# Patient Record
Sex: FEMALE
[De-identification: names, ages, dates, MRNs, and addresses within clinical notes are randomized per-mention and may not be internally consistent; named-entity substitution may affect disease eponyms.]

---

## 2021-02-23 ENCOUNTER — HOSPITAL ENCOUNTER (OUTPATIENT)
Dept: HOSPITAL 35 - PAIN | Age: 61
End: 2021-02-23
Attending: ANESTHESIOLOGY
Payer: COMMERCIAL

## 2021-02-23 VITALS — HEIGHT: 65.98 IN | BODY MASS INDEX: 23.3 KG/M2 | WEIGHT: 145 LBS

## 2021-02-23 VITALS — SYSTOLIC BLOOD PRESSURE: 117 MMHG | DIASTOLIC BLOOD PRESSURE: 81 MMHG

## 2021-02-23 DIAGNOSIS — Z79.899: ICD-10-CM

## 2021-02-23 DIAGNOSIS — M54.6: Primary | ICD-10-CM

## 2021-02-23 DIAGNOSIS — Z88.8: ICD-10-CM

## 2021-02-23 DIAGNOSIS — M79.10: ICD-10-CM

## 2021-02-23 NOTE — NUR
Pain Clinic Assessment:
 
1. History of Osteoarthritis:
NONE
   History of Rheumatoid Arthritis:
NONE
 
2. Height: 5 ft. 6 in. 167.6 cm.
   Weight: 145.0 lb.  oz. 65.772 kg.
   Patient's BMI: 23.4
 
3. Vital Signs:
   BP: 117/81 Pulse: 83 Resp: 14
   Temp:  02 Sat: 100 ECG Mon:
 
4. Pain Intensity: 5-6
 
5. Fall Risk:
   Dizziness: N  Needs help standing or walking: N
   Fallen in the last 3 months: N
   Fall risk comments:
 
 
6. Patient on Blood Thinner: None
 
7. History of Hypertension: N
 
8. Opioid Therapy greater than 6 weeks: N
   Opiate Contract Signed:
 
9. Risk Assessment Tool Provided:
 
10. Functional Assessment Tool:
 
11. Recreational Drug Use: Never Drug Type:
    Tobacco Use: Current Every Day Smoker Tobacco Type: Cigarettes
       Amount or Packs/day: 0.5 How Many Years: 40
    Alcohol Use: No  Frequency:  Quant: Patient sent in by Dr. Trevizo for headache x 1 week.  requesting spinal tap and MRI. Patient c/o nausea, but denies vomiting, fever, blurred vision.

## 2021-02-24 NOTE — HPC
[facility]
[address]
[place]   [address]                     PAIN MANAGEMENT CONSULTATION  
_______________________________________________________________________________
 
Name:       ZION AIKEN              Room #:                     REG SHORTY ROACHKettyGRETTAKetty#:      [id number]                       Account #:      [id number]  
Admission:  21    Attend Phys:    Duglas Ohara DO  
Discharge:              Date of Birth:  [birth year]  
                                                          Report #: [id number]
                                                                    [id number]   
_______________________________________________________________________________
THIS REPORT FOR:  
 
cc:  CJ ESPITIA               
     Physician not on staff                                               
     Duglas Ohara DO                                          ~
DATE OF SERVICE:  2021
 
 
REFERRING PHYSICIAN:  Yves Perez MD; [facility].
 
CHIEF COMPLAINT:  Right thoracic pain.
 
HISTORY OF PRESENT ILLNESS:  As you know, the patient is a 61-year-old female
who reports acute onset of mid thoracic pain that occurred 2020.  The
patient states she sustained a slip and fall injury at work.  She was found to
have thoracic compression fractures and underwent injection therapies with Dr. Walker with minimal benefit.  She underwent radiofrequency lesioning, but reports
that she did not undergo medial branch nerve blocks prior to the lesioning
procedure.  She ultimately underwent thoracic kyphoplasty at T10 in November
with no improvement in symptoms.  She sought further evaluation through
Neurosurgery per the request of Dr. Walker.  The patient states that she has
trialed all available conservative treatment options including physical therapy.
 She is currently in an [place] thoracic brace, which does afford her some
improvement in symptoms.  She has been referred to our service to discuss the
potential for treating right thoracic pain that remains.  There is a question of
whether or not the facet joints at the T7 through T10 level may be the source of
her symptoms on the right side.
 
The patient indicates today her pain is steady with intermittent exacerbations
and periodic increase in pain.  She describes the pain as shooting, cramping,
crushing and stabbing, places current pain score 4/10, daily average of 4-5/10,
worst pain has been is 10/10.  The patient states that pain is exacerbated with
standing, sitting for any length of time or walking, improves with lying down
and the use of diazepam for her reported muscle spasming.  She has been referred
to our service to discuss interventional treatment options to address mid
thoracic right sided pain.
 
PAST MEDICAL HISTORY:
1.  Chronic liver disease.
2.  Osteoporosis.
3.  Muscle spasms.
 
PAST SURGICAL HISTORY:
1.  Herniorrhaphy .
2.   section in  and .
3.  T10 kyphoplasty.
 
 
 
[address]   [address]                     PAIN MANAGEMENT CONSULTATION  
_______________________________________________________________________________
 
Name:       ZION AIKEN              Room #:                     REG SHORTY 
GUMARO#:      [id number]                       Account #:      [id number]  
Admission:  21    Attend Phys:    Duglas Ohara DO  
Discharge:              Date of Birth:  [birth year]  
                                                          Report #: [id number]
                                                                    [id number]   
_______________________________________________________________________________
 
SOCIAL HISTORY:  The patient smokes half a pack of tobacco per day and has done
so for greater than 40 years.  Denies IV or illicit drug use.  Denies any
chronic alcohol use.  She is a  by [employer], but has not been
in work since 2020.  She is on workmen's compensation.  She is in litigation
in regard to her symptoms.  She is unaccompanied at today's visit.
 
REVIEW OF SYSTEMS:  Positive for weight change, fatigue and weakness,
depression, muscle spasms, chronic right mid thoracic pain.  All other review of
systems negative per 12-point review of systems other than those listed in
history of present illness.
 
Pain impact score of 14/70 indicating mild interference of daily activities
secondary to pain.
 
ALLERGIES:  OPIOID MEDICATIONS.
 
CURRENT MEDICATIONS:  Spironolactone 100 mg once a day, furosemide 40 mg once a
day, indomethacin 50 mg once a day, omeprazole 40 mg once a day, duloxetine 30
mg once a day, diazepam 5 mg p.r.n., Tylenol No. 3 with Codeine 1 tab every 8
hours p.r.n. for pain.
 
IMAGING:  MRI of the thoracic spine obtained 2021 shows mild chronic
compression fractures at T4 and T7 without significant height loss.  There is a
chronic compression fracture at T8 resulting in approximately 60% height loss. 
There is minimal retropulsion without significant adjacent spinal canal
narrowing.  There is a vertebral augmentation at T10 with restored right side
T10 vertebral body height, approximately 33% loss of left side height.  There is
mild retropulsion of the fracture resulting in mild narrowing of the spinal
cord.  There is chronic compression fracture at T11 resulting in about 50%
height loss.  No significant associated retropulsion.  No acute fractures in the
thoracic vertebrae.  There is Schmorl's nodes present involving the superior
endplate of T7, inferior endplate of T8, inferior endplate of T11, inferior
endplate of T12 and the superior endplate of L1.  No osteolytic lesions noted. 
There is dextroscoliosis of the thoracic spine noted.
 
PHYSICAL EXAMINATION:
VITAL SIGNS:  Blood pressure 117/81, pulse 83, respiratory rate 14 and
unlabored.  The patient is 100% on room air.  Height 5 feet 6 inches tall,
weight 145 pounds, BMI calculated 23.4.
GENERAL:  Well-developed, well-nourished, well-hydrated 61-year-old female
appearing her stated age.  She is in no acute distress, awake, alert and
oriented x 3.  Current pain score is rated at 5-6/10.
HEENT:  Normocephalic, atraumatic.  Pupils are round, and responsive.  The
patient deemed a good historian.  She is wearing a mask in compliance with
COVID-19 regulations.
 
 
 
[facility]
[address]
[place]   [address]                     PAIN MANAGEMENT CONSULTATION  
_______________________________________________________________________________
 
Name:       ZION AIKEN              Room #:                     REG [facility] 
NANCY.#:      [id number]                       Account #:      [id number]  
Admission:  21    Attend Phys:    Duglas Ohara DO  
Discharge:              Date of Birth:  [birth year]  
                                                          Report #: [id number]
                                                                    [id number]   
_______________________________________________________________________________
LUNGS:  Appear clear, though there is a prolonged expiratory phase.
CARDIOVASCULAR:  Regular.  No appreciable gallop, no rub.
ABDOMEN:  Soft, nontender.
EXTREMITIES:  Show no clubbing, no cyanosis, and no edema.
MUSCULOSKELETAL:  Upper and lower extremity strength appears equal and
symmetrical 5/5.  Muscle bulk and tone is equal and symmetrical in comparing
upper extremities and the lower extremities.  She is intact to light touch from
T1 through T12 dermatomes.  Reflexes appear 2+/4 at biceps, brachioradialis and
triceps, again 2+/4 at patella and Achilles.  There is tenderness to palpation
over the right mid thoracic area.  There is [place] thoracic brace in place. 
There is moderate thoracic kyphosis noted upon removal of the thoracic brace. 
There is tenderness to palpation from the right mid thoracic area towards the
medial scapula on the right.  Deep palpation in area causes intensification of
pain consistent with myofascial symptoms.
 
ASSESSMENT:
1.  Chronic thoracic pain.
2.  Myofascial pain.
 
PLAN:
1.  Based on today's physical exam and the history the patient has provided, the
description the patient uses in regard to pain, it would appear her symptoms may
be related to the facet joints at T7, T8 and T9 with possible addition of T10. 
The patient has difficulty locating the specific source of symptoms as she has
palpatory tenderness throughout the distribution of these vertebral bodies,
specifically over the lateral of the transverse processes and out on the
associated ribs.  Her symptoms based on her descriptors and distribution does
not appear to be radicular in origin.  I do not believe there is any costal
nerve root impingement as the patient does not describe the symptoms in a
neuropathic fashion.  The distribution is more myofascial in origin consistent
with facet arthropathy of the thoracic spine and myofascial underlying
discomfort.  We discussed with the patient the treatment options we have
available.  Following was discussed with the patient today.
 
We discussed physical therapy, stretching exercises and core strengthening
techniques as well as treatment to address the accentuated thoracic kyphosis the
patient is noted to have.  We discussed suggestions in medication management to
utilize either neuropathic pain medication such as amitriptyline, nortriptyline,
Cymbalta, Lyrica or gabapentin to address symptoms directly as well as the
patient could discuss with her PCP about initiating an anxiolytic such as
diazepam, which apparently the patient reported as effective.  We discussed the
injections that Dr. Perez requested, which are the intra-articular facet
injections on the right side to address level T7, T8, T9 and possibly T10.  We
also discussed surgical options, though given the findings on physical exam and
the lack of any major findings at the level, I do not feel this is a treatment
option.  After reviewing risks and benefits of all proposed treatment options,
 
 
 
[address]   [address]                     PAIN MANAGEMENT CONSULTATION  
_______________________________________________________________________________
 
Name:       ZION AIKEN              Room #:                     REG SHORTY NAIK#:      [id number]                       Account #:      [id number]  
Admission:  21    Attend Phys:    Duglas Ohara DO  
Discharge:              Date of Birth:  [birth year]  
                                                          Report #: [id number]
                                                                    [id number]   
_______________________________________________________________________________
the patient chose to move forward with the intra-articular facet injections.
 
We discussed with the patient that third party payer restrictions require that
authorization be obtained before the patient could undergo right T7, T8, T9,
T10, facet injections.  We will begin the authorization process and have the
patient return once this has been approved.  We are hopeful that we will have
this authorization quickly and be able to provide these to the patient in a
timely manner.
2.  No medication changes made at today's visit.  We did make suggestions in
medication management, but the patient will need a primary care physician to
fill those therapies.  She does report the diazepam was very effective at
providing benefit for her back symptoms, which is consistent with myofascial
pain.  I have no concern that the patient remain on diazepam, though she will
need to receive these through her primary care physician or her workmen's
compensation physician.
3.  We will see the patient back in followup visit for the injections proposed
by Dr. Perez in hopes of improving pain in this patient's case.  We are hopeful
we will obtain long-term benefit with these procedures.
4.  We wish to thank Dr. Perez for the referral of this patient to our clinic. 
We will keep you apprised of her response to treatment as we address thoracic
facet arthropathy and overlying myofascial pain.  Again, we wish to thank you
for the opportunity to see the patient in consultation.
 
 
 
 
 
 
 
 
 
 
 
 
 
 
 
 
 
 
 
 
 
 
  <ELECTRONICALLY SIGNED>
   By: Duglas Ohara DO          
  21     1153
D: 21 1557                           _____________________________________
T: 21 1704                           Duglas Ohara DO            /nt

## 2021-03-09 ENCOUNTER — HOSPITAL ENCOUNTER (OUTPATIENT)
Dept: HOSPITAL 35 - PAIN | Age: 61
Discharge: HOME | End: 2021-03-09
Attending: ANESTHESIOLOGY
Payer: COMMERCIAL

## 2021-03-09 VITALS — DIASTOLIC BLOOD PRESSURE: 81 MMHG | SYSTOLIC BLOOD PRESSURE: 109 MMHG

## 2021-03-09 VITALS — BODY MASS INDEX: 25.88 KG/M2 | HEIGHT: 65.98 IN | WEIGHT: 161 LBS

## 2021-03-09 DIAGNOSIS — G89.29: ICD-10-CM

## 2021-03-09 DIAGNOSIS — M79.18: ICD-10-CM

## 2021-03-09 DIAGNOSIS — M54.6: Primary | ICD-10-CM

## 2021-03-09 DIAGNOSIS — Z88.8: ICD-10-CM

## 2021-03-09 DIAGNOSIS — Z98.890: ICD-10-CM

## 2021-03-09 DIAGNOSIS — Z79.891: ICD-10-CM

## 2021-03-09 DIAGNOSIS — F17.210: ICD-10-CM

## 2021-03-09 DIAGNOSIS — Z79.899: ICD-10-CM

## 2021-03-09 NOTE — NUR
Pain Clinic Assessment:
 
1. History of Osteoarthritis:
NONE
   History of Rheumatoid Arthritis:
NONE
 
2. Height: 5 ft. 6 in. 167.6 cm.
   Weight: 161.0 lb.  oz. 73.029 kg.
   Patient's BMI: 26.0
 
3. Vital Signs:
   BP: 109/81 Pulse: 84 Resp: 16
   Temp:  02 Sat: 98 ECG Mon:
 
4. Pain Intensity: 4
 
5. Fall Risk:
   Dizziness: N  Needs help standing or walking: N
   Fallen in the last 3 months: N
   Fall risk comments:
 
 
6. Patient on Blood Thinner: None
 
7. History of Hypertension: N
 
8. Opioid Therapy greater than 6 weeks: N
   Opiate Contract Signed:
 
9. Risk Assessment Tool Provided:
 
10. Functional Assessment Tool:
 
11. Recreational Drug Use: Never Drug Type:
    Tobacco Use: Current Every Day Smoker Tobacco Type: Cigarettes
       Amount or Packs/day: 1/2 PACK How Many Years:
    Alcohol Use: No  Frequency:  Quant:

## 2021-03-10 NOTE — HPC
[facility]
[place]   [address]                     PAIN MANAGEMENT CONSULTATION  
_______________________________________________________________________________
 
Name:       ZION AIKEN              Room #:                     REG SHORTY CRUZKetty#:      [id number]                       Account #:      [id number]  
Admission:  03/09/21    Attend Phys:    Duglas Ohara DO  
Discharge:              Date of Birth:  [birth year]  
                                                          Report #: [id number]
                                                                    [id number]   
_______________________________________________________________________________
THIS REPORT FOR:  
 
cc:  CJ ESPITIA               
     Physician not on staff                                               
     Duglas Ohara DO                                          ~
DATE OF SERVICE:  03/09/2021
 
 
CHIEF COMPLAINT:  Right thoracic pain.
 
HISTORY OF PRESENT ILLNESS:  As you know, the patient is a 61-year-old female
reporting acute onset of mid thoracic pain occurred on 01/17/2020.  The patient
states she sustained a slip and fall injury at work.  She was found to have
thoracic compression fractures, underwent an injection therapies with Dr. Walker
with minimal benefit.  She underwent radiofrequency lesioning, but reports that
she did not undergo medial branch nerve blocks prior to lesioning procedure. 
She states no improvement in symptoms with that treatment.  She was placed in
the [place] brace and discharged back to her PCP for further evaluation.  The
patient sought evaluation with Dr. Perez who referred the patient on to our
clinic to trial facet injections of the T8, T9 and T10 levels to determine if
her source of symptoms are related to the facet joints.  She returns today with
authorization to undergo T8, T9 and T10 facet injections on the right side.
 
ALLERGIES:  OPIOID MEDICATIONS.
 
CURRENT MEDICATIONS:  Spironolactone 100 mg once a day, furosemide 40 mg once a
day, indomethacin 50 mg once a day, omeprazole 40 mg once a day, duloxetine 30
mg once a day, diazepam 5 mg p.r.n., Tylenol No. 3 with Codeine one tablet every
8 hours p.r.n. pain.
 
SOCIAL HISTORY:  The patient smokes half pack tobacco per day, has done so for
greater than 40 years.  Denies IV or illicit drug use.  Denies any chronic
alcohol use.  She is a  by [employer], but has not been working
since 03/2020.  She is unaccompanied today.
 
IMAGING:  No new imaging available.
 
PHYSICAL EXAMINATION:
VITAL SIGNS:  Blood pressure 109/81, pulse is 84, respiratory rate 16 and
unlabored.  The patient 98% on room air.  Height 5 feet 6 inches tall, weight
161 pounds and BMI calculated 26.0.
GENERAL:  Well-developed, well-nourished, well-hydrated 61-year-old female
appearing stated age, pain is rated today 4/10.
HEENT:  Normocephalic and atraumatic.  The patient is wearing a mask in
compliance with COVID-19 regulations.
EXTREMITIES:  Show no clubbing, no cyanosis, and no edema.
 
 
 
[address]   [address]                     PAIN MANAGEMENT CONSULTATION  
_______________________________________________________________________________
 
Name:       ZION AIKEN              [facility]                     [facility]      [id number]                       Account #:      [id number]  
Admission:  03/09/21    Attend Phys:    Duglas Ohara DO  
Discharge:              Date of Birth:  [birth year]  
                                                          Report #: [id number]
                                                                    [id number]   
_______________________________________________________________________________
MUSCULOSKELETAL:  Intact to light touch from T1 through T12 dermatomes. 
Reflexes appear equal and symmetrical in upper and lower extremities.  The
patient is not wearing an [place] brace today, moderate thoracic kyphosis noted. 
Palpatory tenderness over the right mid thoracic area consistent with the
patient's pains area.
 
ASSESSMENT:
1.  Chronic thoracic pain.
2.  Myofascial pain.
3.  Chronic intractable pain.
 
PLAN:
1.  The patient returns today in followup visit having received authorization to
undergo right T8, T9 and T10 intra-articular facet injections per the request of
her neurosurgeon.  The patient and I discussed at length today the risks and the
benefits of this procedure.  These risks include but are not necessarily limited
to bleeding, bruising, infection, worsening pain, no relief of pain, also risk
of temporary or permanent muscle weakness, temporary or permanent nerve damage,
possible paralysis and death.  The patient states understood and wished to
proceed.
2.  No medication changes made at today's visit.  The patient will continue
current medical therapy as prior prescribed.
3.  We plan to see the patient back in followup visit on an as needed basis for
possible next in the series of intra-articular facet injections.  We are hopeful
the patient will see good and prolonged benefit with today's procedure.
 
PROCEDURE NOTE
 
DESCRIPTION OF PROCEDURE:  Right T8, T9, T10 intra-articular facet injections
under fluoroscopic guidance.
 
After obtaining written consent, the patient was taken back to fluoroscopy
suite, placed in prone position with a pillow under the chest to accentuate the
thoracic kyphosis.  The skin overlying the thoracic area was then prepped and
draped in aseptic fashion.  The T8 facet joint, T9 facet joint and the T10 facet
joint on the right side were visualized under AP fluoroscopy with a caudal
angulation.  Skin and subcutaneous tissue overlying target sites of injections
were then anesthetized with 2 mL of 1% lidocaine at each site utilizing a
27-gauge 1-1/4 inch needle.
 
Three 22-gauge 3-1/2 inch spinal needles with bent tips were advanced under
fluoroscopic guidance using a superior, inferior, lateral to medial approach to
the facet joint.  This was done under fluoroscopic guidance.  The firm posterior
capsules had their characteristic feels and the needles were then advanced into
the facet joint, but not into the articular cartilage.  After negative
aspiration for heme, 1.5 mL of a solution containing 2 mL 40 mg per mL, 80 mg
 
 
 
[facility]
[address] [place]   [address]                     PAIN MANAGEMENT CONSULTATION  
_______________________________________________________________________________
 
Name:       MERE AIKENJORIE              Room #:                     REG [facility] 
GUMARO#:      [id number]                       Account #:      [id number]  
Admission:  03/09/21    Attend Phys:    Duglas Ohara DO  
Discharge:              Date of Birth:  [birth year]  
                                                          Report #: [id number]
                                                                    [id number]   
_______________________________________________________________________________
total triamcinolone and 3 mL of bupivacaine 0.5% was injected slowly.  Needle
retracted jail, flushed with 1 mL of 1% lidocaine and removed.  Sterile
bandage placed over each of the injection sites.  There were no new motor
deficits present in the upper or lower extremities following procedure.
 
The patient tolerated the procedure well, carefully escorted to recovery room in
stable condition.  No apparent complications.  VAS before procedure rated at
4/10, VAS 10 minutes after procedure 0/10.  After meeting our discharge
criteria, the patient discharged home.
 
 
 
 
 
 
 
 
 
 
 
 
 
 
 
 
 
 
 
 
 
 
 
 
 
 
 
 
 
 
 
 
 
 
 
  <ELECTRONICALLY SIGNED>
   By: Duglas Ohara DO          
  03/10/21     1035
D: 03/09/21 1449                           _____________________________________
T: 03/09/21 1505                           Duglas Ohara DO            /nt

## 2021-04-20 ENCOUNTER — HOSPITAL ENCOUNTER (OUTPATIENT)
Dept: HOSPITAL 35 - PAIN | Age: 61
End: 2021-04-20
Attending: ANESTHESIOLOGY
Payer: COMMERCIAL

## 2021-04-20 DIAGNOSIS — Z79.899: ICD-10-CM

## 2021-04-20 DIAGNOSIS — M79.10: ICD-10-CM

## 2021-04-20 DIAGNOSIS — G89.29: ICD-10-CM

## 2021-04-20 DIAGNOSIS — M54.6: Primary | ICD-10-CM

## 2021-04-20 DIAGNOSIS — Z88.8: ICD-10-CM

## 2021-04-20 NOTE — NUR
Pain Clinic Assessment:
 
1. History of Osteoarthritis:
NONE
   History of Rheumatoid Arthritis:
NONE
 
2. Height:  ft.  in.  cm.
   Weight:  lb.  oz.  kg.
   Patient's BMI:
 
3. Vital Signs:
   BP:  Pulse:  Resp:
   Temp:  02 Sat:  ECG Mon:
 
4. Pain Intensity: 4
 
5. Fall Risk:
   Dizziness: N  Needs help standing or walking: N
   Fallen in the last 3 months: N
   Fall risk comments:
 
 
6. Patient on Blood Thinner: None
 
7. History of Hypertension: N
 
8. Opioid Therapy greater than 6 weeks: N
   Opiate Contract Signed:
 
9. Risk Assessment Tool Provided:
 
10. Functional Assessment Tool:
 
11. Recreational Drug Use: Never Drug Type:
    Tobacco Use: Current Every Day Smoker Tobacco Type: Cigarettes
       Amount or Packs/day: 1/2 PACK How Many Years:
    Alcohol Use: No  Frequency:  Quant:

## 2021-05-04 ENCOUNTER — HOSPITAL ENCOUNTER (OUTPATIENT)
Dept: HOSPITAL 35 - PAIN | Age: 61
Discharge: HOME | End: 2021-05-04
Attending: ANESTHESIOLOGY
Payer: COMMERCIAL

## 2021-05-04 VITALS — BODY MASS INDEX: 24.43 KG/M2 | WEIGHT: 152 LBS | HEIGHT: 65.98 IN

## 2021-05-04 VITALS — SYSTOLIC BLOOD PRESSURE: 114 MMHG | DIASTOLIC BLOOD PRESSURE: 77 MMHG

## 2021-05-04 DIAGNOSIS — M47.814: ICD-10-CM

## 2021-05-04 DIAGNOSIS — M79.18: ICD-10-CM

## 2021-05-04 DIAGNOSIS — M54.6: Primary | ICD-10-CM

## 2021-05-04 DIAGNOSIS — Z79.899: ICD-10-CM

## 2021-05-04 DIAGNOSIS — F17.210: ICD-10-CM

## 2021-05-04 DIAGNOSIS — Z98.890: ICD-10-CM

## 2021-05-04 DIAGNOSIS — G89.29: ICD-10-CM

## 2021-05-04 NOTE — NUR
Pain Clinic Assessment:
 
1. History of Osteoarthritis:
NONE
   History of Rheumatoid Arthritis:
NONE
 
2. Height: 5 ft. 6 in. 167.6 cm.
   Weight: 152.0 lb.  oz. 68.947 kg.
   Patient's BMI: 24.5
 
3. Vital Signs:
   BP: 114/77 Pulse: 87 Resp: 16
   Temp:  02 Sat: 100 ECG Mon:
 
4. Pain Intensity: 5-6
 
5. Fall Risk:
   Dizziness: N  Needs help standing or walking: N
   Fallen in the last 3 months: N
   Fall risk comments:
 
 
6. Patient on Blood Thinner: None
 
7. History of Hypertension: N
 
8. Opioid Therapy greater than 6 weeks: N
   Opiate Contract Signed:
 
9. Risk Assessment Tool Provided: LOW-3
 
10. Functional Assessment Tool: 14/70
 
11. Recreational Drug Use: Never Drug Type:
    Tobacco Use: Current Every Day Smoker Tobacco Type: Cigarettes
       Amount or Packs/day: 1/2 PACK How Many Years:
    Alcohol Use: No  Frequency:  Quant:

## 2021-05-04 NOTE — HPC
[facility]
[place]   [address]                     PAIN MANAGEMENT CONSULTATION  
_______________________________________________________________________________
 
Name:       ZION AIKEN              Room #:                     REG SHORTY NAIK#:      [id number]                       Account #:      [id number]  
Admission:  04/20/21    Attend Phys:    Duglas Ohara DO  
Discharge:              Date of Birth:  [birth year]  
                                                          Report #: [id number]
                                                                    [id number] 
_______________________________________________________________________________
THIS REPORT FOR:  
 
cc:  CJ ESPITIA               
     Physician not on staff                                               
     Duglas Ohara DO                                          ~
DOC #: [id number]
 
cc:     MD Duglas Koenig DO
DATE OF SERVICE: 04/20/2021
 
REFERRING PHYSICIAN:  Brian Mirza MD
 
CHIEF COMPLAINT:  Right thoracic pain.
 
HISTORY OF PRESENT ILLNESS:  As you know, the patient is a very pleasant 
61-year-old female who has undergone injection to address facet arthropathy pain
at T8, T9 and T10.  She reports that procedure provided improvement in symptoms 
of approximately 50%, lasting for a couple of weeks.  She returns today in 
followup visit to undergo the next in the series.  Unfortunately, she has 
planned to undergo COVID-19 injection tomorrow, which precludes us from 
providing the injection today.  She returns to discuss scheduling for the next 
available appointment.  The patient could undergo facet injections at the 
thoracic level.  The patient is placing her current pain score around 4/10.  
Unfortunately, her symptoms recurred without inciting injury or trauma.
 
ALLERGIES:  OPIOID MEDICATIONS.
 
CURRENT MEDICATIONS:  See chart.
 
SOCIAL HISTORY:  The patient continues to smoke half pack of tobacco per day, 
has done so for greater than 40 years.  Denies IV or illicit drug use.  Denies 
any chronic alcohol use.  She is unaccompanied today.
 
IMAGING:  No new imaging available.
 
PHYSICAL EXAMINATION:
GENERAL:  Well-developed, well-nourished, well-hydrated 61-year-old female 
appearing stated age.  Pain is rated today at anywhere from 4-5/10.
HEENT:  Normocephalic, atraumatic.  Pupils equal, round and responsive.  The 
patient is wearing a mask in compliance with COVID-19 regulations.
EXTREMITIES:  Show no clubbing, no cyanosis.  No appreciable edema.
MUSCULOSKELETAL:  Upper extremity strength equal and symmetrical 5/5, intact to 
light touch from T1 to T12 dermatomes.
 
ASSESSMENT:
 
 
 
[address]   [address]                     PAIN MANAGEMENT CONSULTATION  
_______________________________________________________________________________
 
Name:       ZION AIKEN              Room #:                     REG [facility].#:      [id number]                       Account #:      [id number]  
Admission:  04/20/21    Attend Phys:    Duglas Ohara DO  
Discharge:              Date of Birth:  [birth year]  
                                                          Report #: [id number]
                                                                    [id number] 
_______________________________________________________________________________
1.  Chronic thoracic pain.
2.  Myofascial pain.
3.  Chronic intractable pain.
 
PLAN:
1.  The patient returns today in followup visit to undergo a thoracic facet 
injection to address right upper back pain.  Unfortunately, the patient has 
plans to undergo COVID-19 injection tomorrow, which precludes the patient from 
receiving steroid medications for 2 weeks.  We will plan to have the patient 
follow up with us in 2 weeks for treatment.  We will keep you apprised of her 
response to that therapy once it has been provided.  We are hopeful the patient 
will once again see improvement in symptoms, but for a prolonged period of time.
2.  The patient will be started on nabumetone 500 mg dose 1 tab p.o. t.i.d. 
given the patient #90 tablets, advised the patient to watch for side effects of 
dyspepsia, worsening of blood pressure, lower extremity edema with her use.  If 
no side effects, the patient is to continue the medication as directed.
3.  We will see the patient back for a followup visit in approximately 2 weeks. 
At that time, review the efficacy of the medications and discuss the proposed 
intra-articular facet injections.
 
Duglas Ohara DO
JEMARK/KINZA/SHEFALIE
 
D: 04/26/2021 10:08 AM
T: 04/27/2021 03:29 AM
 
 
 
 
 
 
 
 
 
 
 
 
 
 
 
 
 
 
 
  <ELECTRONICALLY SIGNED>
   By: Duglas Ohara DO          
  05/04/21     0749
D: 04/26/21 0908                           _____________________________________
T: 04/27/21 0229                           Duglas Ohara DO            /nt

## 2021-05-11 NOTE — HPC
[facility]
[place]   [address]                     PAIN MANAGEMENT CONSULTATION  
_______________________________________________________________________________
 
Name:       ZION AIKEN              Room #:                     REG SHORTY CRUZ.#:      [id number]                       Account #:      [id number]  
Admission:  21    Attend Phys:    Duglas Ohara DO  
Discharge:              Date of Birth:  [birth year]  
                                                          Report #: [id number]
                                                                    [id number] 
_______________________________________________________________________________
THIS REPORT FOR:  
 
cc:  CJ ESPITIA               
     Physician not on staff                                               
     Duglas Ohara DO                                          ~
DOC #: [id number]
 
 
DATE OF SERVICE: 2021
 
REFERRING PHYSICIAN:  Brian Mirza MD
 
CHIEF COMPLAINT:  Right thoracic pain.
 
HISTORY OF PRESENT ILLNESS:  As you know, the patient is a very pleasant 
61-year-old female who has undergone treatment at our services per the request 
of her neurosurgery team with right T8, T9 and T10 facet injections with good 
benefit, unfortunately, her symptoms reoccurred.  She denies injury or trauma.  
She was seen in consultation 2021.  At that time, she was undergoing 
COVID-19 injections, which precludes us from providing steroids until 2 weeks 
have  beyond the last injection.  She returns today to undergo right T8, 
T9, T10 intra-articular facet injections to address recurrent back pain.  She is
placing her pain today at around 6/10.  She describes the pain as shooting, 
cramping, stabbing and intermittent in nature, exacerbated with sitting, 
walking, improves with medications.  She denies injury or trauma that led to 
recurrence of symptoms.
 
ALLERGIES:  OPIOID.
 
CURRENT MEDICATIONS:  See chart.
 
SOCIAL HISTORY:  The patient continues to smoke half pack tobacco per day, has 
done so for greater than 40 years.  Denies IV or illicit drug use.  Denies any 
chronic alcohol use.  She is unaccompanied today.
 
IMAGING:  No new imaging is available.
 
PHYSICAL EXAMINATION:
VITAL SIGNS:  Blood pressure 114/77, pulse 87, respiratory rate 16 and 
unlabored.  The patient is 100% on room air.  Height 5 feet 6 inches tall, 
weight 152 pounds, BMI calculated 24.9.
GENERAL:  Well-developed, well-nourished, well-hydrated kyphotic 61-year-old 
female appearing stated age.  She is placing current pain score at around 6/10.
HEENT:  Normocephalic, atraumatic.  Extraocular muscles are intact.  The patient
is wearing a level 1 face mask in compliance with COVID-19 regulations.
EXTREMITIES:  Show no clubbing, no cyanosis, no edema.
 
 
 
[address]   [address]                     PAIN MANAGEMENT CONSULTATION  
_______________________________________________________________________________
 
Name:       ZION AIKEN              [facility]      [id number]                       Account #:      [id number]  
Admission:  21    Attend Phys:    Duglas Ohara DO  
Discharge:              Date of Birth:  [birth year]  
                                                          Report #: [id number]
                                                                    [id number] 
_______________________________________________________________________________
MUSCULOSKELETAL:  Upper extremity strength remains symmetrical 5/5 intact to 
light touch from T1 through T12 dermatomes.  There is palpatory tenderness noted
over the paraspinal musculature of the right mid thoracic area consistent with 
the T8, T9 and T10 levels.
 
ASSESSMENT:
1.  Chronic thoracic pain.
2.  Myofascial pain.
3.  Vertebral compression fracture, status post kyphoplasty with residual pain.
4.  Chronic intractable pain.
 
PLAN:
1.  The patient returns today in followup visit having completed her COVID-19 
injections 2 weeks ago in preparation to undergo right T8, T9 and T10 
intraarticular facet injections per the request of her neurosurgery team.  She 
has done very well with the previous injections, but unfortunately her symptoms 
did reoccur.  She returns today per the request to undergo the second in the 
series.  The patient has been advised the risks and benefits of the procedure, 
states understood and wished to proceed.
2.  No medication changes made at today's visit.  The patient will continue 
current medical therapy as prior prescribed.
3.  The patient will be following up with her neurosurgery team as they have 
requested the patient undergo CT examination, so that they can review whether or
not the vertebral augmentation procedure and the subsequent extravasation of 
methyl methacrylate may be causing nerve root impingement and irritation, they 
also wish to determine whether or not foraminal stenosis has occurred.  She will
keep us apprised when she undergoes the imaging study.  We will review those 
findings once they are available.  She has plans to undergo that procedure once 
neurosurgery has provided the referral.  We are hopeful that this imaging study 
will be done quickly.  We will review those findings and determine whether or 
not we can assist in pain control.
4.  We plan to see the patient back in followup visit on an as needed basis for 
the next in the series of right intraarticular facet injections to address 
thoracic pain.
 
PROCEDURE NOTE
 
DESCRIPTION OF PROCEDURE:  Right T8, T9 and T10 intraarticular facet injections 
under fluoroscopic guidance.
 
After obtaining written consent, the patient was taken back to fluoroscopy 
suite, placed in a prone position with pillow under the chest to accentuate the 
thoracic kyphosis and to facilitate entry into the facet joints.  Skin overlying
the thoracic area was then prepped and draped in aseptic fashion using 
chlorhexidine.  The T8, T9 and T10 facet joints on the right side were 
visualized under AP fluoroscopy with slight caudal angulation of the 
 
 
 
[address]   [address]                     PAIN MANAGEMENT CONSULTATION  
_______________________________________________________________________________
 
Name:       ZION AIKEN              Room #:                     REG CLI 
M.R.#:      [id number]                       Account #:      [id number]  
Admission:  21    Attend Phys:    Duglas Ohara DO  
Discharge:              Date of Birth:  [birth year]  
                                                          Report #: [id number]
                                                                    [id number] 
_______________________________________________________________________________
fluoroscope.  The skin and subcutaneous tissue overlying the target site of 
injection were anesthetized with 3 mL 1% lidocaine with utilizing 1 mL at each 
site.
 
Three 22-gauge 3-1/2 inch spinal needles with bent tips were advanced under 
fluoroscopic guidance using a superior, inferior, lateral to medial approach to 
the facet joint.  This was done under direct fluoroscopic imaging and guidance. 
The firm posterior capsules at each of the sites at T8, T9 and T10 had their 
characteristic feels when the needles were then advanced through the capsule and
into the facet joint, but not into the articular cartilage.  After negative 
aspiration for heme, 1.5 mL of a solution containing 2 mL, 40 mg per mL 80 mg 
total triamcinolone and 3 mL of bupivacaine 0.5% injected slowly.  Needles were 
retracted nursing home flushed with 1 mL of 1% lidocaine and then removed.  Sterile 
bandage placed over injection site.  No new motor deficits present in the upper 
or lower extremities, following procedure.
 
The patient tolerated the procedure well, carefully escorted to recovery room in
stable condition.  No apparent complications.  After meeting discharge criteria,
the patient discharged home.
 
Duglas Ohara DO
JEJ/SHAKEEL
 
D: 2021 04:37 PM
T: 2021 12:44 AM
 
 
 
 
 
 
 
 
 
 
 
 
 
 
 
 
 
 
 
  <ELECTRONICALLY SIGNED>
   By: Duglas Ohara DO          
  21     0927
D: 21 1537                           _____________________________________
T: 21 2344                           Duglas Ohara DO            /nt

## 2021-10-05 ENCOUNTER — HOSPITAL ENCOUNTER (OUTPATIENT)
Dept: HOSPITAL 35 - PAIN | Age: 61
Discharge: HOME | End: 2021-10-05
Attending: ANESTHESIOLOGY
Payer: COMMERCIAL

## 2021-10-05 VITALS — HEIGHT: 65.98 IN | WEIGHT: 149.8 LBS | BODY MASS INDEX: 24.08 KG/M2

## 2021-10-05 VITALS — DIASTOLIC BLOOD PRESSURE: 70 MMHG | SYSTOLIC BLOOD PRESSURE: 115 MMHG

## 2021-10-05 DIAGNOSIS — M54.6: ICD-10-CM

## 2021-10-05 DIAGNOSIS — G89.29: ICD-10-CM

## 2021-10-05 DIAGNOSIS — M47.814: Primary | ICD-10-CM

## 2021-10-05 DIAGNOSIS — Z79.899: ICD-10-CM

## 2021-10-05 DIAGNOSIS — Z98.890: ICD-10-CM

## 2021-10-05 DIAGNOSIS — F17.210: ICD-10-CM

## 2021-10-05 NOTE — NUR
Pain Clinic Assessment:
 
1. History of Osteoarthritis:
NONE
   History of Rheumatoid Arthritis:
NONE
 
2. Height: 5 ft. 6 in. 167.6 cm.
   Weight: 149.8 lb.  oz. 67.949 kg.
   Patient's BMI: 24.2
 
3. Vital Signs:
   BP: 115/70 Pulse: 88 Resp: 16
   Temp:  02 Sat: 100 ECG Mon:
 
4. Pain Intensity: 6
 
5. Fall Risk:
   Dizziness: N  Needs help standing or walking: N
   Fallen in the last 3 months: Y
   Fall risk comments:
 
 
6. Patient on Blood Thinner: None
 
7. History of Hypertension: N
 
8. Opioid Therapy greater than 6 weeks: N
   Opiate Contract Signed:
 
9. Risk Assessment Tool Provided: LOW-3
 
10. Functional Assessment Tool: 14/70
 
11. Recreational Drug Use: Never Drug Type:
    Tobacco Use: Current Every Day Smoker Tobacco Type: Cigarettes
       Amount or Packs/day: 1/2 PACK How Many Years:
    Alcohol Use: No  Frequency:  Quant:

## 2021-10-12 NOTE — HPC
[facility]
[address]
[place]   [address]                     PAIN MANAGEMENT CONSULTATION  
_______________________________________________________________________________
 
Name:       ZION AIKEN              Room #:                     REG SHORTY NAIK#:      [id number]                       Account #:      [id number]  
Admission:  10/05/21    Attend Phys:    Duglas Ohara DO  
Discharge:              Date of Birth:  [birth year]  
                                                          Report #: [id number]
                                                                    [id number] 
_______________________________________________________________________________
THIS REPORT FOR:  
 
cc:  GABRIELE GONZALEZ                  
     Physician not on staff                                               
     Duglas Ohara DO                                          ~
 
cc:     Brian Mirza
DATE OF SERVICE: 10/05/2021
 
REFERRING PHYSICIAN:  Dr. Brian Mirza.
 
CHIEF COMPLAINT:  Right thoracic pain.
 
HISTORY OF PRESENT ILLNESS:  As you know, the patient is a very pleasant 
61-year-old female, who has undergone intra-articular facet injections at T8, T9
and T10 with excellent benefit. The most recent injection series provided to 
address the patient's symptoms, gave her 100% improvement in overall pain, 
lasting for almost 7 weeks.  She has had a slow and progressive return of 
symptoms.  She is now placing her pain score at 6/10.  She denies new injury, 
new trauma or any changes in medication management since our last visit.  She 
returns today in followup visit to undergo right T8, T9 and T10 intra-articular 
facet injections in hopes of reobtained analgesic benefit.
 
ALLERGIES:  OPIOID MEDICATIONS.
 
CURRENT MEDICATIONS:  See chart.
 
SOCIAL HISTORY:  The patient continues to smoke approximately half pack tobacco 
per day and has done so for greater than 40 years.  Denies IV or illicit drug 
use.  Denies any chronic alcohol use.  She is unaccompanied at today's visit.
 
IMAGING:  No new imaging available.
 
PHYSICAL EXAMINATION:
VITAL SIGNS:  Blood pressure is 115/70, pulse 88, respiratory rate 16 and 
unlabored.  The patient 100% on room air.  Height 5 feet 6 inches tall, weight 
149.8 pounds, BMI calculated 24.2.
GENERAL:  A well-developed, well-nourished, well-hydrated kyphotic 61-year-old 
female, appears her stated age, placing current pain score at 6/10.
HEENT:  Normocephalic, atraumatic.  Pupils equal, round and responsive.  She is 
wearing a mask in compliance with COVID-19 regulations at this hospital.
EXTREMITIES:  Show no clubbing, no cyanosis.  No appreciable edema.
MUSCULOSKELETAL:  The patient remains intact to light touch from T1-T12 
dermatomes.  Upper extremity strength is equal and symmetrical 5/5.  Muscle bulk
and tone is equal and symmetrical in comparing upper extremities.  There is 
palpatory tenderness over the right mid thoracic area.  No ecchymosis, no 
 
 
 
[place] [address]                     PAIN MANAGEMENT CONSULTATION  
_______________________________________________________________________________
 
Name:       ZION AIKEN              Room #:                     REG [facility] 
MORKetty#:      [id number]                       Account #:      [id number]  
Admission:  10/05/21    Attend Phys:    Duglas Ohara DO  
Discharge:              Date of Birth:  [birth year]  
                                                          Report #: [id number]
                                                                    [id number] 
_______________________________________________________________________________
rashchirag, no lesions.
 
ASSESSMENT:
1.  Chronic thoracic facet pain.
2.  Myofascial pain.
3.  Chronic intractable pain.
 
PLAN:
1.  The patient returns today in followup visit having noted excellent benefit 
with intra-articular facet injections provided at the right T8, T9 and T10 
levels.  She reports improvement in symptoms lasting for greater than 7 weeks.  
She returns today in followup visit having received authorization to undergo 
right T8, T9, T10 intra-articular facet injections in hopes of gaining a similar
analgesic benefit.  The patient has been advised risks and benefits of this 
procedure, states understood and wished to proceed.
2.  No medication changes made at today's visit.  The patient will continue 
current medical therapy as prior prescribed.
3.  I plan to see the patient back in followup visit on an as needed basis.  We 
are hopeful the patient will once again see good and prolonged benefit with the 
procedure performed today.
 
PROCEDURE NOTE:
 
DESCRIPTION OF PROCEDURE:  Right T8, T9, and T10 intraarticular facet injections
under fluoroscopic guidance.
 
After obtaining written consent, the patient was taken back to fluoroscopy 
suite, placed in a prone position with pillow under the chest to accentuate 
thoracic kyphosis.  Skin overlying the thoracic area was then prepped and draped
in aseptic fashion.  The T8 facet joint, the T9 facet joint and the T10 facet 
joint on the right side were visualized under AP fluoroscopy with a caudal 
angulation to optimize imaging position.  Skin and subcutaneous tissue overlying
target site of injection was then anesthetized with 1 mL of 1% preservative-free
lidocaine at each site, utilizing a 27-gauge 1-1/4-inch needle to provide these 
injections.
 
Three 22-gauge 3-1/2 inch spinal needles with bent tips were advanced under 
fluoroscopic guidance using a superior, inferior, lateral to medial approach to 
look towards the facet joint.  Imaging was obtained to confirm position of the 
needles prior to entering the facet joints.  The firm posterior capsules had 
their characteristic feels once the needles were advanced.  These needles were 
then advanced into the facet joint, but not into the articular cartilage.  After
negative aspiration for heme, 1.5 mL of a solution containing 2 mL, 40 mg per mL
80 mg total triamcinolone along with 3 mL bupivacaine 0.5% was injected slowly. 
Each of the needles were retracted prison flushed with 1 mL of 1% lidocaine and
removed.  Sterile bandage placed over injection site.  There were no [address]   [address]                     PAIN MANAGEMENT CONSULTATION  
_______________________________________________________________________________
 
Name:       ZION AIKEN              Room #:                     REG [facility] 
GUMARO#:      [id number]                       Account #:      [id number]  
Admission:  10/05/21    Attend Phys:    Duglas Ohara DO  
Discharge:              Date of Birth:  [birth year]  
                                                          Report #: [id number]
                                                                    [id number] 
_______________________________________________________________________________
deficits present in the upper or lower extremities following procedure.
 
The patient tolerated the procedure well, carefully escorted to recovery room in
stable condition.  No apparent complications.  VAS before procedure rated at 
6/10, VAS 10 minutes after procedure rated at 2/10.  After meeting our discharge
criteria, the patient discharged home.
 
 
 
 
 
 
 
 
 
 
 
 
 
 
 
 
 
 
 
 
 
 
 
 
 
 
 
 
 
 
 
 
 
 
 
 
 
 
  <ELECTRONICALLY SIGNED>
   By: Duglas Ohara DO          
  10/12/21     0913
D: 10/05/21 1502                           _____________________________________
T: 10/05/21 2237                           Duglas Ohara DO            /nt